# Patient Record
Sex: FEMALE
[De-identification: names, ages, dates, MRNs, and addresses within clinical notes are randomized per-mention and may not be internally consistent; named-entity substitution may affect disease eponyms.]

---

## 2020-06-02 ENCOUNTER — APPOINTMENT (OUTPATIENT)
Dept: OTOLARYNGOLOGY | Facility: CLINIC | Age: 65
End: 2020-06-02
Payer: COMMERCIAL

## 2020-06-02 VITALS
HEIGHT: 62 IN | HEART RATE: 91 BPM | BODY MASS INDEX: 28.34 KG/M2 | TEMPERATURE: 97.3 F | WEIGHT: 154 LBS | SYSTOLIC BLOOD PRESSURE: 145 MMHG | DIASTOLIC BLOOD PRESSURE: 83 MMHG

## 2020-06-02 DIAGNOSIS — Z78.9 OTHER SPECIFIED HEALTH STATUS: ICD-10-CM

## 2020-06-02 DIAGNOSIS — Z82.49 FAMILY HISTORY OF ISCHEMIC HEART DISEASE AND OTHER DISEASES OF THE CIRCULATORY SYSTEM: ICD-10-CM

## 2020-06-02 DIAGNOSIS — H93.8X3 OTHER SPECIFIED DISORDERS OF EAR, BILATERAL: ICD-10-CM

## 2020-06-02 PROBLEM — Z00.00 ENCOUNTER FOR PREVENTIVE HEALTH EXAMINATION: Status: ACTIVE | Noted: 2020-06-02

## 2020-06-02 PROCEDURE — 99203 OFFICE O/P NEW LOW 30 MIN: CPT | Mod: 25

## 2020-06-02 PROCEDURE — 69210 REMOVE IMPACTED EAR WAX UNI: CPT

## 2020-06-02 RX ORDER — HYDROCORTISONE 25 MG/G
2.5 OINTMENT TOPICAL
Qty: 20 | Refills: 0 | Status: ACTIVE | COMMUNITY
Start: 2020-05-11

## 2020-06-02 RX ORDER — KETOCONAZOLE 20 MG/G
2 CREAM TOPICAL
Qty: 60 | Refills: 0 | Status: ACTIVE | COMMUNITY
Start: 2020-05-13

## 2020-06-02 RX ORDER — TRIAMCINOLONE ACETONIDE 1 MG/G
0.1 OINTMENT TOPICAL
Qty: 15 | Refills: 0 | Status: ACTIVE | COMMUNITY
Start: 2020-05-26

## 2020-06-02 NOTE — PROCEDURE
[Cerumen Impaction] : Cerumen Impaction [Same] : same as the Pre Op Dx. [] : Removal of Cerumen [FreeTextEntry6] : Cerumen was removed under binocular microscopy from left ear with a combination of a suction and/or a loop curette. The patient tolerated the procedure well and there were no complications. The  findings are noted above.\par

## 2020-06-02 NOTE — HISTORY OF PRESENT ILLNESS
[de-identified] : 64 year old female here for clogged ears.  States tried and over the counter ear removal kit and scratched the inside of the ear.  States intermittent dizziness.  States hears crackling in the right ear.  Patient denies otalgia, otorrhea, ear infections, hearing loss, tinnitus, vertigo, headaches related to hearing.\par

## 2020-06-02 NOTE — PHYSICAL EXAM
[de-identified] : Left impacted; right normal [Normal] : orientation to person, place, and time: normal

## 2025-07-26 ENCOUNTER — NON-APPOINTMENT (OUTPATIENT)
Age: 70
End: 2025-07-26